# Patient Record
Sex: FEMALE
[De-identification: names, ages, dates, MRNs, and addresses within clinical notes are randomized per-mention and may not be internally consistent; named-entity substitution may affect disease eponyms.]

---

## 2020-08-18 ENCOUNTER — HOSPITAL ENCOUNTER (INPATIENT)
Dept: HOSPITAL 90 - EDH | Age: 85
LOS: 17 days | Discharge: NURSING, REHAB OR LONG-TERM CARE | DRG: 70 | End: 2020-09-04
Attending: INTERNAL MEDICINE | Admitting: INTERNAL MEDICINE
Payer: MEDICARE

## 2020-08-18 VITALS — WEIGHT: 152.5 LBS | HEIGHT: 60 IN | BODY MASS INDEX: 29.94 KG/M2

## 2020-08-18 DIAGNOSIS — E11.649: ICD-10-CM

## 2020-08-18 DIAGNOSIS — Z86.19: ICD-10-CM

## 2020-08-18 DIAGNOSIS — I10: ICD-10-CM

## 2020-08-18 DIAGNOSIS — E11.9: ICD-10-CM

## 2020-08-18 DIAGNOSIS — N17.9: ICD-10-CM

## 2020-08-18 DIAGNOSIS — G93.41: Primary | ICD-10-CM

## 2020-08-18 DIAGNOSIS — F03.90: ICD-10-CM

## 2020-08-18 DIAGNOSIS — U07.1: ICD-10-CM

## 2020-08-18 DIAGNOSIS — N39.0: ICD-10-CM

## 2020-08-18 DIAGNOSIS — R53.81: ICD-10-CM

## 2020-08-18 DIAGNOSIS — R62.7: ICD-10-CM

## 2020-08-18 DIAGNOSIS — Z74.01: ICD-10-CM

## 2020-08-18 DIAGNOSIS — J12.89: ICD-10-CM

## 2020-08-18 DIAGNOSIS — E86.0: ICD-10-CM

## 2020-08-18 DIAGNOSIS — Z87.440: ICD-10-CM

## 2020-08-18 DIAGNOSIS — D64.9: ICD-10-CM

## 2020-08-18 DIAGNOSIS — E83.52: ICD-10-CM

## 2020-08-18 DIAGNOSIS — E44.0: ICD-10-CM

## 2020-08-18 LAB
ALBUMIN SERPL-MCNC: 2.5 G/DL (ref 3.5–5)
ALT SERPL-CCNC: 22 U/L (ref 12–78)
AST SERPL-CCNC: 36 U/L (ref 10–37)
BASOPHILS NFR BLD AUTO: 0.6 % (ref 0–5)
BILIRUB SERPL-MCNC: 0.9 MG/DL (ref 0.2–1)
BUN SERPL-MCNC: 26 MG/DL (ref 7–18)
CHLORIDE SERPL-SCNC: 108 MMOL/L (ref 101–111)
CO2 SERPL-SCNC: 24 MMOL/L (ref 21–32)
CREAT SERPL-MCNC: 1.7 MG/DL (ref 0.5–1.5)
DEPRECATED SQUAMOUS URNS QL MICRO: (no result) /HPF (ref 0–2)
EOSINOPHIL NFR BLD AUTO: 0.5 % (ref 0–8)
ERYTHROCYTE [DISTWIDTH] IN BLOOD BY AUTOMATED COUNT: 19.7 % (ref 11–15.5)
GFR SERPL CREATININE-BSD FRML MDRD: 30 ML/MIN (ref 60–?)
GLUCOSE SERPL-MCNC: 129 MG/DL (ref 70–105)
HBA1C MFR BLD: 5.6 % (ref 4–6)
HCT VFR BLD AUTO: 37.9 % (ref 36–48)
HGB UR QL STRIP: (no result)
LYMPHOCYTES NFR SPEC AUTO: 25.3 % (ref 21–51)
MCH RBC QN AUTO: 29.2 PG (ref 27–33)
MCHC RBC AUTO-ENTMCNC: 31.9 G/DL (ref 32–36)
MCV RBC AUTO: 91.5 FL (ref 79–99)
MONOCYTES NFR BLD AUTO: 7.1 % (ref 3–13)
NEUTROPHILS NFR BLD AUTO: 65.9 % (ref 40–77)
NRBC BLD MANUAL-RTO: 0 % (ref 0–0.19)
PH UR STRIP: 5.5 [PH] (ref 5–8)
PLATELET # BLD AUTO: 272 K/UL (ref 130–400)
POTASSIUM SERPL-SCNC: 4.1 MMOL/L (ref 3.5–5.1)
PROT SERPL-MCNC: 7.1 G/DL (ref 6–8.3)
PROT UR QL STRIP: (no result) MG/DL
RBC # BLD AUTO: 4.14 MIL/UL (ref 4–5.5)
RBC #/AREA URNS HPF: (no result) /HPF (ref 0–1)
RBC MORPH BLD: (no result)
SODIUM SERPL-SCNC: 142 MMOL/L (ref 136–145)
SP GR UR STRIP: 1.01 (ref 1–1.03)
UROBILINOGEN UR STRIP-MCNC: 1 MG/DL (ref 0.2–1)
WBC # BLD AUTO: 15.8 K/UL (ref 4.8–10.8)
WBC #/AREA URNS HPF: (no result) /HPF (ref 0–1)

## 2020-08-18 PROCEDURE — 82330 ASSAY OF CALCIUM: CPT

## 2020-08-18 PROCEDURE — 82306 VITAMIN D 25 HYDROXY: CPT

## 2020-08-18 PROCEDURE — 81001 URINALYSIS AUTO W/SCOPE: CPT

## 2020-08-18 PROCEDURE — 83550 IRON BINDING TEST: CPT

## 2020-08-18 PROCEDURE — 82040 ASSAY OF SERUM ALBUMIN: CPT

## 2020-08-18 PROCEDURE — 80053 COMPREHEN METABOLIC PANEL: CPT

## 2020-08-18 PROCEDURE — 83540 ASSAY OF IRON: CPT

## 2020-08-18 PROCEDURE — 87040 BLOOD CULTURE FOR BACTERIA: CPT

## 2020-08-18 PROCEDURE — 97039 UNLISTED MODALITY: CPT

## 2020-08-18 PROCEDURE — 83605 ASSAY OF LACTIC ACID: CPT

## 2020-08-18 PROCEDURE — 92610 EVALUATE SWALLOWING FUNCTION: CPT

## 2020-08-18 PROCEDURE — 84443 ASSAY THYROID STIM HORMONE: CPT

## 2020-08-18 PROCEDURE — 87088 URINE BACTERIA CULTURE: CPT

## 2020-08-18 PROCEDURE — 71045 X-RAY EXAM CHEST 1 VIEW: CPT

## 2020-08-18 PROCEDURE — 84132 ASSAY OF SERUM POTASSIUM: CPT

## 2020-08-18 PROCEDURE — 85025 COMPLETE CBC W/AUTO DIFF WBC: CPT

## 2020-08-18 PROCEDURE — 83735 ASSAY OF MAGNESIUM: CPT

## 2020-08-18 PROCEDURE — 86592 SYPHILIS TEST NON-TREP QUAL: CPT

## 2020-08-18 PROCEDURE — 82140 ASSAY OF AMMONIA: CPT

## 2020-08-18 PROCEDURE — 94664 DEMO&/EVAL PT USE INHALER: CPT

## 2020-08-18 PROCEDURE — 82948 REAGENT STRIP/BLOOD GLUCOSE: CPT

## 2020-08-18 PROCEDURE — 82607 VITAMIN B-12: CPT

## 2020-08-18 PROCEDURE — 76705 ECHO EXAM OF ABDOMEN: CPT

## 2020-08-18 PROCEDURE — 80048 BASIC METABOLIC PNL TOTAL CA: CPT

## 2020-08-18 PROCEDURE — 84145 PROCALCITONIN (PCT): CPT

## 2020-08-18 PROCEDURE — 83036 HEMOGLOBIN GLYCOSYLATED A1C: CPT

## 2020-08-18 PROCEDURE — 82746 ASSAY OF FOLIC ACID SERUM: CPT

## 2020-08-18 PROCEDURE — 86334 IMMUNOFIX E-PHORESIS SERUM: CPT

## 2020-08-18 PROCEDURE — 82728 ASSAY OF FERRITIN: CPT

## 2020-08-18 PROCEDURE — 83970 ASSAY OF PARATHORMONE: CPT

## 2020-08-18 PROCEDURE — 70450 CT HEAD/BRAIN W/O DYE: CPT

## 2020-08-18 PROCEDURE — 36415 COLL VENOUS BLD VENIPUNCTURE: CPT

## 2020-08-18 PROCEDURE — 84100 ASSAY OF PHOSPHORUS: CPT

## 2020-08-18 PROCEDURE — 93005 ELECTROCARDIOGRAM TRACING: CPT

## 2020-08-18 RX ADMIN — FAMOTIDINE SCH MG: 20 TABLET, FILM COATED ORAL at 21:00

## 2020-08-18 RX ADMIN — PIPERACILLIN SODIUM AND TAZOBACTAM SODIUM SCH MLS/HR: .375; 3 INJECTION, POWDER, LYOPHILIZED, FOR SOLUTION INTRAVENOUS at 21:15

## 2020-08-18 RX ADMIN — PIPERACILLIN SODIUM AND TAZOBACTAM SODIUM SCH MLS/HR: .375; 3 INJECTION, POWDER, LYOPHILIZED, FOR SOLUTION INTRAVENOUS at 13:15

## 2020-08-18 RX ADMIN — SODIUM CHLORIDE SCH MLS/HR: 0.9 INJECTION, SOLUTION INTRAVENOUS at 13:15

## 2020-08-19 VITALS — DIASTOLIC BLOOD PRESSURE: 69 MMHG | SYSTOLIC BLOOD PRESSURE: 111 MMHG

## 2020-08-19 VITALS — SYSTOLIC BLOOD PRESSURE: 143 MMHG | DIASTOLIC BLOOD PRESSURE: 79 MMHG

## 2020-08-19 VITALS — SYSTOLIC BLOOD PRESSURE: 119 MMHG | DIASTOLIC BLOOD PRESSURE: 63 MMHG

## 2020-08-19 VITALS — DIASTOLIC BLOOD PRESSURE: 71 MMHG | SYSTOLIC BLOOD PRESSURE: 140 MMHG

## 2020-08-19 VITALS — DIASTOLIC BLOOD PRESSURE: 61 MMHG | SYSTOLIC BLOOD PRESSURE: 120 MMHG

## 2020-08-19 VITALS — DIASTOLIC BLOOD PRESSURE: 61 MMHG | SYSTOLIC BLOOD PRESSURE: 114 MMHG

## 2020-08-19 LAB
ALBUMIN SERPL-MCNC: 1.8 G/DL (ref 3.5–5)
ALT SERPL-CCNC: 14 U/L (ref 12–78)
AST SERPL-CCNC: 18 U/L (ref 10–37)
BASOPHILS NFR BLD AUTO: 0.7 % (ref 0–5)
BILIRUB SERPL-MCNC: 0.6 MG/DL (ref 0.2–1)
BUN SERPL-MCNC: 21 MG/DL (ref 7–18)
CHLORIDE SERPL-SCNC: 116 MMOL/L (ref 101–111)
CO2 SERPL-SCNC: 25 MMOL/L (ref 21–32)
CREAT SERPL-MCNC: 1.3 MG/DL (ref 0.5–1.5)
EOSINOPHIL NFR BLD AUTO: 1.3 % (ref 0–8)
ERYTHROCYTE [DISTWIDTH] IN BLOOD BY AUTOMATED COUNT: 19.7 % (ref 11–15.5)
GFR SERPL CREATININE-BSD FRML MDRD: 41 ML/MIN (ref 60–?)
GLUCOSE SERPL-MCNC: 84 MG/DL (ref 70–105)
HCT VFR BLD AUTO: 28.2 % (ref 36–48)
LYMPHOCYTES NFR SPEC AUTO: 27.2 % (ref 21–51)
MCH RBC QN AUTO: 29.8 PG (ref 27–33)
MCHC RBC AUTO-ENTMCNC: 32.3 G/DL (ref 32–36)
MCV RBC AUTO: 92.5 FL (ref 79–99)
MONOCYTES NFR BLD AUTO: 7.8 % (ref 3–13)
NEUTROPHILS NFR BLD AUTO: 61.9 % (ref 40–77)
NRBC BLD MANUAL-RTO: 0 % (ref 0–0.19)
PLATELET # BLD AUTO: 199 K/UL (ref 130–400)
POTASSIUM SERPL-SCNC: 3 MMOL/L (ref 3.5–5.1)
PROT SERPL-MCNC: 5.3 G/DL (ref 6–8.3)
RBC # BLD AUTO: 3.05 MIL/UL (ref 4–5.5)
SODIUM SERPL-SCNC: 147 MMOL/L (ref 136–145)
WBC # BLD AUTO: 10.7 K/UL (ref 4.8–10.8)

## 2020-08-19 RX ADMIN — PIPERACILLIN SODIUM AND TAZOBACTAM SODIUM SCH MLS/HR: .375; 3 INJECTION, POWDER, LYOPHILIZED, FOR SOLUTION INTRAVENOUS at 12:42

## 2020-08-19 RX ADMIN — FAMOTIDINE SCH MG: 20 TABLET, FILM COATED ORAL at 20:08

## 2020-08-19 RX ADMIN — PIPERACILLIN SODIUM AND TAZOBACTAM SODIUM SCH MLS/HR: .375; 3 INJECTION, POWDER, LYOPHILIZED, FOR SOLUTION INTRAVENOUS at 20:08

## 2020-08-19 RX ADMIN — POTASSIUM CHLORIDE PRN MEQ: 1.5 SOLUTION ORAL at 13:55

## 2020-08-19 RX ADMIN — PIPERACILLIN SODIUM AND TAZOBACTAM SODIUM SCH MLS/HR: .375; 3 INJECTION, POWDER, LYOPHILIZED, FOR SOLUTION INTRAVENOUS at 04:21

## 2020-08-19 RX ADMIN — POTASSIUM CHLORIDE PRN MEQ: 1.5 SOLUTION ORAL at 11:25

## 2020-08-19 RX ADMIN — FAMOTIDINE SCH MG: 20 TABLET, FILM COATED ORAL at 09:32

## 2020-08-19 RX ADMIN — POTASSIUM CHLORIDE PRN MEQ: 1.5 SOLUTION ORAL at 17:44

## 2020-08-19 RX ADMIN — SODIUM CHLORIDE SCH MLS/HR: 0.9 INJECTION, SOLUTION INTRAVENOUS at 09:15

## 2020-08-19 RX ADMIN — SODIUM CHLORIDE SCH MLS/HR: 0.9 INJECTION, SOLUTION INTRAVENOUS at 19:15

## 2020-08-19 RX ADMIN — SODIUM CHLORIDE SCH MLS/HR: 0.9 INJECTION, SOLUTION INTRAVENOUS at 04:21

## 2020-08-19 NOTE — NUR
CHART CHECK COMPLETED. 



Pt IS AN 85 Y.O. FEMALE ADMITTED SECONDARY TO AMS, DEHYDRATION, AND SEPSIS. Pt HAS A PAST 
MEDICAL HISTORY SIGNIFICANT FOR DM, HYPERTENSION, COVID JUNE 2020, CHOLECYSTECTOMY. Pt 
CURRENTLY ON REGULAR TEXTURE,THIN LIQUID DIET (HEART HEALTHY). PLEASE REQUEST FORMAL SKILLED 
SPEECH/SWALLOW EVALUATION IF Pt PRESENTS WITH +S/S OF ASPIRATION SUCH AS COUGH RESPONSE, 
THROAT CLEAR, OR WET VOCAL QUALITY DURING P.O. 

-------------------------------------------------------------------------------

Addendum: 08/19/20 at 1548 by BRITTANY DIALLO, [facility] ST

-------------------------------------------------------------------------------

Amended: Links added.

## 2020-08-19 NOTE — NUR
REPORTED TO ME LOW K OF 3.0, I INFORMED MAGEN WILSON FOR HOSPITALIST AND WILL COVER PT 
WITH POTASSIUM PROTOCOL.

## 2020-08-19 NOTE — NUR
REQUEST SENT TO [facility] FOR MEDICAL RECORDS AND GRAND DAUGHTER GAVE PERMISION TO 
REQUEST;  SHE ALSO STATED ONLY HOME MED PT TAKES IS LACTULOSE.

## 2020-08-20 VITALS — DIASTOLIC BLOOD PRESSURE: 66 MMHG | SYSTOLIC BLOOD PRESSURE: 127 MMHG

## 2020-08-20 VITALS — DIASTOLIC BLOOD PRESSURE: 80 MMHG | SYSTOLIC BLOOD PRESSURE: 142 MMHG

## 2020-08-20 VITALS — SYSTOLIC BLOOD PRESSURE: 129 MMHG | DIASTOLIC BLOOD PRESSURE: 74 MMHG

## 2020-08-20 VITALS — SYSTOLIC BLOOD PRESSURE: 118 MMHG | DIASTOLIC BLOOD PRESSURE: 66 MMHG

## 2020-08-20 VITALS — DIASTOLIC BLOOD PRESSURE: 69 MMHG | SYSTOLIC BLOOD PRESSURE: 133 MMHG

## 2020-08-20 LAB
ALBUMIN SERPL-MCNC: 2 G/DL (ref 3.5–5)
ALT SERPL-CCNC: 18 U/L (ref 12–78)
APPEARANCE UR: (no result)
AST SERPL-CCNC: 21 U/L (ref 10–37)
BASOPHILS NFR BLD AUTO: 0.8 % (ref 0–5)
BILIRUB SERPL-MCNC: 0.7 MG/DL (ref 0.2–1)
BILIRUB UR QL STRIP: NEGATIVE
BUN SERPL-MCNC: 15 MG/DL (ref 7–18)
CHLORIDE SERPL-SCNC: 116 MMOL/L (ref 101–111)
CO2 SERPL-SCNC: 21 MMOL/L (ref 21–32)
COLOR UR: YELLOW
CREAT SERPL-MCNC: 1.2 MG/DL (ref 0.5–1.5)
EOSINOPHIL NFR BLD AUTO: 2.7 % (ref 0–8)
ERYTHROCYTE [DISTWIDTH] IN BLOOD BY AUTOMATED COUNT: 19.9 % (ref 11–15.5)
GFR SERPL CREATININE-BSD FRML MDRD: 45 ML/MIN (ref 60–?)
GLUCOSE SERPL-MCNC: 80 MG/DL (ref 70–105)
GLUCOSE UR STRIP-MCNC: NEGATIVE MG/DL
HCT VFR BLD AUTO: 31.5 % (ref 36–48)
HGB UR QL STRIP: (no result)
KETONES UR STRIP-MCNC: NEGATIVE MG/DL
LEUKOCYTE ESTERASE UR QL STRIP: (no result)
LYMPHOCYTES NFR SPEC AUTO: 32.5 % (ref 21–51)
MCH RBC QN AUTO: 29.2 PG (ref 27–33)
MCHC RBC AUTO-ENTMCNC: 31.4 G/DL (ref 32–36)
MCV RBC AUTO: 92.9 FL (ref 79–99)
MONOCYTES NFR BLD AUTO: 7.4 % (ref 3–13)
NEUTROPHILS NFR BLD AUTO: 55.5 % (ref 40–77)
NITRITE UR QL STRIP: NEGATIVE
NRBC BLD MANUAL-RTO: 0 % (ref 0–0.19)
PH UR STRIP: 6.5 [PH] (ref 5–8)
PLATELET # BLD AUTO: 240 K/UL (ref 130–400)
POTASSIUM SERPL-SCNC: 3.9 MMOL/L (ref 3.5–5.1)
PROT SERPL-MCNC: 5.8 G/DL (ref 6–8.3)
PROT UR QL STRIP: 30 MG/DL
RBC # BLD AUTO: 3.39 MIL/UL (ref 4–5.5)
RBC #/AREA URNS HPF: (no result) /HPF (ref 0–1)
SODIUM SERPL-SCNC: 148 MMOL/L (ref 136–145)
SP GR UR STRIP: 1.01 (ref 1–1.03)
UROBILINOGEN UR STRIP-MCNC: 0.2 MG/DL (ref 0.2–1)
WBC # BLD AUTO: 12.7 K/UL (ref 4.8–10.8)
WBC #/AREA URNS HPF: (no result) /HPF (ref 0–1)

## 2020-08-20 RX ADMIN — PIPERACILLIN SODIUM AND TAZOBACTAM SODIUM SCH MLS/HR: .375; 3 INJECTION, POWDER, LYOPHILIZED, FOR SOLUTION INTRAVENOUS at 21:13

## 2020-08-20 RX ADMIN — SODIUM CHLORIDE SCH MLS/HR: 0.9 INJECTION, SOLUTION INTRAVENOUS at 04:00

## 2020-08-20 RX ADMIN — PIPERACILLIN SODIUM AND TAZOBACTAM SODIUM SCH MLS/HR: .375; 3 INJECTION, POWDER, LYOPHILIZED, FOR SOLUTION INTRAVENOUS at 12:44

## 2020-08-20 RX ADMIN — FAMOTIDINE SCH MG: 20 TABLET, FILM COATED ORAL at 08:03

## 2020-08-20 RX ADMIN — FAMOTIDINE SCH MG: 20 TABLET, FILM COATED ORAL at 21:13

## 2020-08-20 RX ADMIN — PIPERACILLIN SODIUM AND TAZOBACTAM SODIUM SCH MLS/HR: .375; 3 INJECTION, POWDER, LYOPHILIZED, FOR SOLUTION INTRAVENOUS at 04:16

## 2020-08-21 VITALS — SYSTOLIC BLOOD PRESSURE: 114 MMHG | DIASTOLIC BLOOD PRESSURE: 61 MMHG

## 2020-08-21 VITALS — SYSTOLIC BLOOD PRESSURE: 116 MMHG | DIASTOLIC BLOOD PRESSURE: 53 MMHG

## 2020-08-21 VITALS — DIASTOLIC BLOOD PRESSURE: 64 MMHG | SYSTOLIC BLOOD PRESSURE: 111 MMHG

## 2020-08-21 VITALS — DIASTOLIC BLOOD PRESSURE: 62 MMHG | SYSTOLIC BLOOD PRESSURE: 95 MMHG

## 2020-08-21 VITALS — DIASTOLIC BLOOD PRESSURE: 66 MMHG | SYSTOLIC BLOOD PRESSURE: 106 MMHG

## 2020-08-21 VITALS — SYSTOLIC BLOOD PRESSURE: 111 MMHG | DIASTOLIC BLOOD PRESSURE: 68 MMHG

## 2020-08-21 VITALS — DIASTOLIC BLOOD PRESSURE: 69 MMHG | SYSTOLIC BLOOD PRESSURE: 126 MMHG

## 2020-08-21 LAB
ALBUMIN SERPL-MCNC: 2.2 G/DL (ref 3.5–5)
ALT SERPL-CCNC: 18 U/L (ref 12–78)
AST SERPL-CCNC: 23 U/L (ref 10–37)
BASOPHILS NFR BLD AUTO: 0.7 % (ref 0–5)
BILIRUB SERPL-MCNC: 0.6 MG/DL (ref 0.2–1)
BUN SERPL-MCNC: 11 MG/DL (ref 7–18)
CHLORIDE SERPL-SCNC: 112 MMOL/L (ref 101–111)
CO2 SERPL-SCNC: 23 MMOL/L (ref 21–32)
CREAT SERPL-MCNC: 1 MG/DL (ref 0.5–1.5)
EOSINOPHIL NFR BLD AUTO: 3.7 % (ref 0–8)
ERYTHROCYTE [DISTWIDTH] IN BLOOD BY AUTOMATED COUNT: 19.9 % (ref 11–15.5)
GFR SERPL CREATININE-BSD FRML MDRD: 56 ML/MIN (ref 60–?)
GLUCOSE SERPL-MCNC: 78 MG/DL (ref 70–105)
HCT VFR BLD AUTO: 36.4 % (ref 36–48)
LYMPHOCYTES NFR SPEC AUTO: 33.5 % (ref 21–51)
MCH RBC QN AUTO: 29.4 PG (ref 27–33)
MCHC RBC AUTO-ENTMCNC: 31.9 G/DL (ref 32–36)
MCV RBC AUTO: 92.2 FL (ref 79–99)
MONOCYTES NFR BLD AUTO: 6.5 % (ref 3–13)
NEUTROPHILS NFR BLD AUTO: 54.7 % (ref 40–77)
NRBC BLD MANUAL-RTO: 0 % (ref 0–0.19)
PLATELET # BLD AUTO: 259 K/UL (ref 130–400)
POTASSIUM SERPL-SCNC: 4.2 MMOL/L (ref 3.5–5.1)
PROT SERPL-MCNC: 6.5 G/DL (ref 6–8.3)
RBC # BLD AUTO: 3.95 MIL/UL (ref 4–5.5)
SODIUM SERPL-SCNC: 145 MMOL/L (ref 136–145)
WBC # BLD AUTO: 13.7 K/UL (ref 4.8–10.8)

## 2020-08-21 RX ADMIN — PIPERACILLIN SODIUM AND TAZOBACTAM SODIUM SCH MLS/HR: .375; 3 INJECTION, POWDER, LYOPHILIZED, FOR SOLUTION INTRAVENOUS at 05:22

## 2020-08-21 RX ADMIN — FLUCONAZOLE SCH MG: 100 TABLET ORAL at 12:38

## 2020-08-21 RX ADMIN — WATER SCH GM: 1 INJECTION INTRAVENOUS at 20:31

## 2020-08-21 RX ADMIN — PIPERACILLIN SODIUM AND TAZOBACTAM SODIUM SCH MLS/HR: .375; 3 INJECTION, POWDER, LYOPHILIZED, FOR SOLUTION INTRAVENOUS at 20:30

## 2020-08-21 RX ADMIN — FAMOTIDINE SCH MG: 20 TABLET, FILM COATED ORAL at 08:35

## 2020-08-21 RX ADMIN — FAMOTIDINE SCH MG: 20 TABLET, FILM COATED ORAL at 20:31

## 2020-08-21 RX ADMIN — PIPERACILLIN SODIUM AND TAZOBACTAM SODIUM SCH MLS/HR: .375; 3 INJECTION, POWDER, LYOPHILIZED, FOR SOLUTION INTRAVENOUS at 16:44

## 2020-08-21 NOTE — NUR
IV restart to left forearm, 22 yumiko with good blood return. Dressing applied per protocol.

AM care provided.  No new variance this shift, pt remained A/O x 1. Blood sugar is 78 this

morning.  Apple juice given, pending breakfast.

## 2020-08-21 NOTE — NUR
UPDATED DAUGHTER ON DC PLANS.

 NO CONSENT ON ANY REFERRAL TO SNF YET. SUGGESTED BNR IN [place] BECAUSE IT IS IN 
NETWORK WIHT THE INSURANCE, BUT GRANDDAUGHTER MARIJA DID NOT WANT TO CONSDER YET. WANTS  TO 
INVESTIGATE ALL  FACILITIES THAT TAKE ALLWELL.



CALL TO [facility] - THEY DO

CALL TO [facility] - THEY  DO NOT

CALL TO [facility]-NO NOT  

[facility], NOT

 THIS INFO PASSED TO MARIJA VIA TEXT. AWAITING RESPONSE






-------------------------------------------------------------------------------

Addendum: 08/21/20 at 1153 by FLAVIA BRIAN RN CM

-------------------------------------------------------------------------------

Amended: Links added.

## 2020-08-21 NOTE — NUR
assessment

patient awake, alert, ox1, no sob, no c/o pain at this time, no sob at this time, turn 
patient q2 hours, f/c to gravity drainage with cloudy urine, keep siderails up x3 for safety 
precautions and bedalarm intact

## 2020-08-22 VITALS — SYSTOLIC BLOOD PRESSURE: 124 MMHG | DIASTOLIC BLOOD PRESSURE: 65 MMHG

## 2020-08-22 VITALS — DIASTOLIC BLOOD PRESSURE: 73 MMHG | SYSTOLIC BLOOD PRESSURE: 125 MMHG

## 2020-08-22 VITALS — SYSTOLIC BLOOD PRESSURE: 120 MMHG | DIASTOLIC BLOOD PRESSURE: 70 MMHG

## 2020-08-22 VITALS — DIASTOLIC BLOOD PRESSURE: 63 MMHG | SYSTOLIC BLOOD PRESSURE: 107 MMHG

## 2020-08-22 VITALS — DIASTOLIC BLOOD PRESSURE: 52 MMHG | SYSTOLIC BLOOD PRESSURE: 103 MMHG

## 2020-08-22 VITALS — SYSTOLIC BLOOD PRESSURE: 122 MMHG | DIASTOLIC BLOOD PRESSURE: 70 MMHG

## 2020-08-22 LAB
ALBUMIN SERPL-MCNC: 1.8 G/DL (ref 3.5–5)
ALT SERPL-CCNC: 16 U/L (ref 12–78)
AST SERPL-CCNC: 21 U/L (ref 10–37)
BASOPHILS NFR BLD AUTO: 0.8 % (ref 0–5)
BILIRUB SERPL-MCNC: 0.4 MG/DL (ref 0.2–1)
BUN SERPL-MCNC: 11 MG/DL (ref 7–18)
CHLORIDE SERPL-SCNC: 113 MMOL/L (ref 101–111)
CO2 SERPL-SCNC: 20 MMOL/L (ref 21–32)
CREAT SERPL-MCNC: 1 MG/DL (ref 0.5–1.5)
EOSINOPHIL NFR BLD AUTO: 6.4 % (ref 0–8)
ERYTHROCYTE [DISTWIDTH] IN BLOOD BY AUTOMATED COUNT: 19.9 % (ref 11–15.5)
GFR SERPL CREATININE-BSD FRML MDRD: 56 ML/MIN (ref 60–?)
GLUCOSE SERPL-MCNC: 97 MG/DL (ref 70–105)
HCT VFR BLD AUTO: 31 % (ref 36–48)
LYMPHOCYTES NFR SPEC AUTO: 37.2 % (ref 21–51)
MCH RBC QN AUTO: 29.2 PG (ref 27–33)
MCHC RBC AUTO-ENTMCNC: 32.3 G/DL (ref 32–36)
MCV RBC AUTO: 90.6 FL (ref 79–99)
MONOCYTES NFR BLD AUTO: 6.4 % (ref 3–13)
NEUTROPHILS NFR BLD AUTO: 48.1 % (ref 40–77)
NRBC BLD MANUAL-RTO: 0 % (ref 0–0.19)
PLATELET # BLD AUTO: 233 K/UL (ref 130–400)
POTASSIUM SERPL-SCNC: 3.3 MMOL/L (ref 3.5–5.1)
PROT SERPL-MCNC: 5.3 G/DL (ref 6–8.3)
RBC # BLD AUTO: 3.42 MIL/UL (ref 4–5.5)
SODIUM SERPL-SCNC: 145 MMOL/L (ref 136–145)
WBC # BLD AUTO: 10.6 K/UL (ref 4.8–10.8)

## 2020-08-22 RX ADMIN — WATER SCH GM: 1 INJECTION INTRAVENOUS at 19:25

## 2020-08-22 RX ADMIN — POTASSIUM CHLORIDE PRN MEQ: 1.5 SOLUTION ORAL at 18:24

## 2020-08-22 RX ADMIN — PIPERACILLIN SODIUM AND TAZOBACTAM SODIUM SCH MLS/HR: .375; 3 INJECTION, POWDER, LYOPHILIZED, FOR SOLUTION INTRAVENOUS at 15:21

## 2020-08-22 RX ADMIN — FLUCONAZOLE SCH MG: 100 TABLET ORAL at 07:57

## 2020-08-22 RX ADMIN — FAMOTIDINE SCH MG: 20 TABLET, FILM COATED ORAL at 07:57

## 2020-08-22 RX ADMIN — PIPERACILLIN SODIUM AND TAZOBACTAM SODIUM SCH MLS/HR: .375; 3 INJECTION, POWDER, LYOPHILIZED, FOR SOLUTION INTRAVENOUS at 05:20

## 2020-08-22 RX ADMIN — POTASSIUM CHLORIDE PRN MEQ: 1.5 SOLUTION ORAL at 07:57

## 2020-08-22 RX ADMIN — POTASSIUM CHLORIDE PRN MEQ: 1.5 SOLUTION ORAL at 06:52

## 2020-08-22 RX ADMIN — PIPERACILLIN SODIUM AND TAZOBACTAM SODIUM SCH MLS/HR: .375; 3 INJECTION, POWDER, LYOPHILIZED, FOR SOLUTION INTRAVENOUS at 21:45

## 2020-08-22 RX ADMIN — WATER SCH GM: 1 INJECTION INTRAVENOUS at 07:56

## 2020-08-22 RX ADMIN — FAMOTIDINE SCH MG: 20 TABLET, FILM COATED ORAL at 20:07

## 2020-08-22 NOTE — NUR
SNF/HNR:



Received call this am from ru Quarles. She mentions that family has decided to 
refer pt to [facility]. telephone consent obtained. 



Clinical/PASRR faxed to [facility]. Spoke sandeep Garcias-Liaison, she mentions 
will review information and submit to insurance for auth. 



CN updated. CM to continue to follow.

## 2020-08-22 NOTE — NUR
assessment

patient more awake and talkative, ox1, no sob, no c/o pain at this time, turn patient q2 
hours, no teaching performed , unable to comprehend teaching

## 2020-08-23 VITALS — SYSTOLIC BLOOD PRESSURE: 125 MMHG | DIASTOLIC BLOOD PRESSURE: 71 MMHG

## 2020-08-23 VITALS — DIASTOLIC BLOOD PRESSURE: 60 MMHG | SYSTOLIC BLOOD PRESSURE: 111 MMHG

## 2020-08-23 VITALS — SYSTOLIC BLOOD PRESSURE: 122 MMHG | DIASTOLIC BLOOD PRESSURE: 73 MMHG

## 2020-08-23 VITALS — DIASTOLIC BLOOD PRESSURE: 50 MMHG | SYSTOLIC BLOOD PRESSURE: 105 MMHG

## 2020-08-23 VITALS — DIASTOLIC BLOOD PRESSURE: 53 MMHG | SYSTOLIC BLOOD PRESSURE: 105 MMHG

## 2020-08-23 VITALS — DIASTOLIC BLOOD PRESSURE: 80 MMHG | SYSTOLIC BLOOD PRESSURE: 113 MMHG

## 2020-08-23 LAB
BASOPHILS NFR BLD AUTO: 0.9 % (ref 0–5)
BUN SERPL-MCNC: 10 MG/DL (ref 7–18)
CHLORIDE SERPL-SCNC: 114 MMOL/L (ref 101–111)
CO2 SERPL-SCNC: 23 MMOL/L (ref 21–32)
CREAT SERPL-MCNC: 1.1 MG/DL (ref 0.5–1.5)
EOSINOPHIL NFR BLD AUTO: 6.1 % (ref 0–8)
ERYTHROCYTE [DISTWIDTH] IN BLOOD BY AUTOMATED COUNT: 20.2 % (ref 11–15.5)
GFR SERPL CREATININE-BSD FRML MDRD: 50 ML/MIN (ref 60–?)
GLUCOSE SERPL-MCNC: 84 MG/DL (ref 70–105)
HCT VFR BLD AUTO: 33.3 % (ref 36–48)
LYMPHOCYTES NFR SPEC AUTO: 34 % (ref 21–51)
MCH RBC QN AUTO: 28.9 PG (ref 27–33)
MCHC RBC AUTO-ENTMCNC: 31.5 G/DL (ref 32–36)
MCV RBC AUTO: 91.7 FL (ref 79–99)
MONOCYTES NFR BLD AUTO: 8.2 % (ref 3–13)
NEUTROPHILS NFR BLD AUTO: 49.5 % (ref 40–77)
NRBC BLD MANUAL-RTO: 0 % (ref 0–0.19)
PLATELET # BLD AUTO: 222 K/UL (ref 130–400)
POTASSIUM SERPL-SCNC: 4.1 MMOL/L (ref 3.5–5.1)
RBC # BLD AUTO: 3.63 MIL/UL (ref 4–5.5)
SODIUM SERPL-SCNC: 145 MMOL/L (ref 136–145)
WBC # BLD AUTO: 13.6 K/UL (ref 4.8–10.8)

## 2020-08-23 RX ADMIN — PIPERACILLIN SODIUM AND TAZOBACTAM SODIUM SCH MLS/HR: .375; 3 INJECTION, POWDER, LYOPHILIZED, FOR SOLUTION INTRAVENOUS at 15:01

## 2020-08-23 RX ADMIN — WATER SCH GM: 1 INJECTION INTRAVENOUS at 19:53

## 2020-08-23 RX ADMIN — PIPERACILLIN SODIUM AND TAZOBACTAM SODIUM SCH MLS/HR: .375; 3 INJECTION, POWDER, LYOPHILIZED, FOR SOLUTION INTRAVENOUS at 21:49

## 2020-08-23 RX ADMIN — PIPERACILLIN SODIUM AND TAZOBACTAM SODIUM SCH MLS/HR: .375; 3 INJECTION, POWDER, LYOPHILIZED, FOR SOLUTION INTRAVENOUS at 05:02

## 2020-08-23 RX ADMIN — WATER SCH GM: 1 INJECTION INTRAVENOUS at 10:19

## 2020-08-23 RX ADMIN — FAMOTIDINE SCH MG: 20 TABLET, FILM COATED ORAL at 10:19

## 2020-08-23 RX ADMIN — FAMOTIDINE SCH MG: 20 TABLET, FILM COATED ORAL at 19:54

## 2020-08-23 RX ADMIN — FLUCONAZOLE SCH MG: 100 TABLET ORAL at 10:19
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
## 2020-08-24 VITALS — DIASTOLIC BLOOD PRESSURE: 66 MMHG | SYSTOLIC BLOOD PRESSURE: 123 MMHG

## 2020-08-24 VITALS — DIASTOLIC BLOOD PRESSURE: 50 MMHG | SYSTOLIC BLOOD PRESSURE: 108 MMHG

## 2020-08-24 VITALS — DIASTOLIC BLOOD PRESSURE: 62 MMHG | SYSTOLIC BLOOD PRESSURE: 101 MMHG

## 2020-08-24 VITALS — DIASTOLIC BLOOD PRESSURE: 72 MMHG | SYSTOLIC BLOOD PRESSURE: 106 MMHG

## 2020-08-24 VITALS — SYSTOLIC BLOOD PRESSURE: 115 MMHG | DIASTOLIC BLOOD PRESSURE: 66 MMHG

## 2020-08-24 VITALS — DIASTOLIC BLOOD PRESSURE: 63 MMHG | SYSTOLIC BLOOD PRESSURE: 121 MMHG

## 2020-08-24 LAB
BASOPHILS NFR BLD AUTO: 0.8 % (ref 0–5)
BUN SERPL-MCNC: 10 MG/DL (ref 7–18)
CHLORIDE SERPL-SCNC: 111 MMOL/L (ref 101–111)
CO2 SERPL-SCNC: 23 MMOL/L (ref 21–32)
CREAT SERPL-MCNC: 1.1 MG/DL (ref 0.5–1.5)
EOSINOPHIL NFR BLD AUTO: 6.9 % (ref 0–8)
ERYTHROCYTE [DISTWIDTH] IN BLOOD BY AUTOMATED COUNT: 19.9 % (ref 11–15.5)
GFR SERPL CREATININE-BSD FRML MDRD: 50 ML/MIN (ref 60–?)
GLUCOSE SERPL-MCNC: 78 MG/DL (ref 70–105)
HCT VFR BLD AUTO: 32.4 % (ref 36–48)
LYMPHOCYTES NFR SPEC AUTO: 25.8 % (ref 21–51)
MCH RBC QN AUTO: 29.1 PG (ref 27–33)
MCHC RBC AUTO-ENTMCNC: 32.1 G/DL (ref 32–36)
MCV RBC AUTO: 90.8 FL (ref 79–99)
MONOCYTES NFR BLD AUTO: 8.1 % (ref 3–13)
NEUTROPHILS NFR BLD AUTO: 57 % (ref 40–77)
NRBC BLD MANUAL-RTO: 0 % (ref 0–0.19)
PLATELET # BLD AUTO: 202 K/UL (ref 130–400)
POTASSIUM SERPL-SCNC: 4 MMOL/L (ref 3.5–5.1)
RBC # BLD AUTO: 3.57 MIL/UL (ref 4–5.5)
SODIUM SERPL-SCNC: 142 MMOL/L (ref 136–145)
WBC # BLD AUTO: 14.5 K/UL (ref 4.8–10.8)

## 2020-08-24 RX ADMIN — PIPERACILLIN SODIUM AND TAZOBACTAM SODIUM SCH MLS/HR: .375; 3 INJECTION, POWDER, LYOPHILIZED, FOR SOLUTION INTRAVENOUS at 22:54

## 2020-08-24 RX ADMIN — FAMOTIDINE SCH MG: 20 TABLET, FILM COATED ORAL at 19:31

## 2020-08-24 RX ADMIN — WATER SCH GM: 1 INJECTION INTRAVENOUS at 19:31

## 2020-08-24 RX ADMIN — WATER SCH GM: 1 INJECTION INTRAVENOUS at 09:14

## 2020-08-24 RX ADMIN — FAMOTIDINE SCH MG: 20 TABLET, FILM COATED ORAL at 09:14

## 2020-08-24 RX ADMIN — PIPERACILLIN SODIUM AND TAZOBACTAM SODIUM SCH MLS/HR: .375; 3 INJECTION, POWDER, LYOPHILIZED, FOR SOLUTION INTRAVENOUS at 16:01

## 2020-08-24 RX ADMIN — FLUCONAZOLE SCH MG: 100 TABLET ORAL at 09:14

## 2020-08-24 RX ADMIN — PIPERACILLIN SODIUM AND TAZOBACTAM SODIUM SCH MLS/HR: .375; 3 INJECTION, POWDER, LYOPHILIZED, FOR SOLUTION INTRAVENOUS at 04:42

## 2020-08-24 NOTE — NUR
CHART REVIEWED, ANTICIPATING DISCHARGE TO [facility] TODAY [facility], WILL SEND UPDATED AND MED 
REC WHEN AVAILABLE AND WILL ARRANGE EMS TRANSPORT. WILL FOLLOW WITH LESIA SHERMAN. MORENO, 

-------------------------------------------------------------------------------

Addendum: 08/24/20 at 0917 by FLAVIA BRIAN RN CM

-------------------------------------------------------------------------------

Amended: Links added.

## 2020-08-25 VITALS — DIASTOLIC BLOOD PRESSURE: 67 MMHG | SYSTOLIC BLOOD PRESSURE: 109 MMHG

## 2020-08-25 VITALS — SYSTOLIC BLOOD PRESSURE: 138 MMHG | DIASTOLIC BLOOD PRESSURE: 71 MMHG

## 2020-08-25 VITALS — SYSTOLIC BLOOD PRESSURE: 121 MMHG | DIASTOLIC BLOOD PRESSURE: 76 MMHG

## 2020-08-25 VITALS — SYSTOLIC BLOOD PRESSURE: 115 MMHG | DIASTOLIC BLOOD PRESSURE: 72 MMHG

## 2020-08-25 VITALS — DIASTOLIC BLOOD PRESSURE: 62 MMHG | SYSTOLIC BLOOD PRESSURE: 101 MMHG

## 2020-08-25 VITALS — SYSTOLIC BLOOD PRESSURE: 134 MMHG | DIASTOLIC BLOOD PRESSURE: 54 MMHG

## 2020-08-25 LAB
BASOPHILS NFR BLD AUTO: 0.8 % (ref 0–5)
BUN SERPL-MCNC: 10 MG/DL (ref 7–18)
CHLORIDE SERPL-SCNC: 110 MMOL/L (ref 101–111)
CO2 SERPL-SCNC: 26 MMOL/L (ref 21–32)
CREAT SERPL-MCNC: 1.1 MG/DL (ref 0.5–1.5)
EOSINOPHIL NFR BLD AUTO: 5.8 % (ref 0–8)
ERYTHROCYTE [DISTWIDTH] IN BLOOD BY AUTOMATED COUNT: 19.8 % (ref 11–15.5)
GFR SERPL CREATININE-BSD FRML MDRD: 50 ML/MIN (ref 60–?)
GLUCOSE SERPL-MCNC: 88 MG/DL (ref 70–105)
HCT VFR BLD AUTO: 33.6 % (ref 36–48)
LYMPHOCYTES NFR SPEC AUTO: 31.5 % (ref 21–51)
MCH RBC QN AUTO: 29.5 PG (ref 27–33)
MCHC RBC AUTO-ENTMCNC: 32.4 G/DL (ref 32–36)
MCV RBC AUTO: 90.8 FL (ref 79–99)
MONOCYTES NFR BLD AUTO: 7.1 % (ref 3–13)
NEUTROPHILS NFR BLD AUTO: 53.5 % (ref 40–77)
NRBC BLD MANUAL-RTO: 0 % (ref 0–0.19)
PLATELET # BLD AUTO: 186 K/UL (ref 130–400)
POTASSIUM SERPL-SCNC: 3.8 MMOL/L (ref 3.5–5.1)
RBC # BLD AUTO: 3.7 MIL/UL (ref 4–5.5)
SODIUM SERPL-SCNC: 143 MMOL/L (ref 136–145)
WBC # BLD AUTO: 12.8 K/UL (ref 4.8–10.8)

## 2020-08-25 RX ADMIN — PIPERACILLIN SODIUM AND TAZOBACTAM SODIUM SCH MLS/HR: .375; 3 INJECTION, POWDER, LYOPHILIZED, FOR SOLUTION INTRAVENOUS at 20:04

## 2020-08-25 RX ADMIN — FAMOTIDINE SCH MG: 20 TABLET, FILM COATED ORAL at 09:26

## 2020-08-25 RX ADMIN — PIPERACILLIN SODIUM AND TAZOBACTAM SODIUM SCH MLS/HR: .375; 3 INJECTION, POWDER, LYOPHILIZED, FOR SOLUTION INTRAVENOUS at 13:17

## 2020-08-25 RX ADMIN — FLUCONAZOLE SCH MG: 100 TABLET ORAL at 09:26

## 2020-08-25 RX ADMIN — WATER SCH GM: 1 INJECTION INTRAVENOUS at 09:26

## 2020-08-25 RX ADMIN — PIPERACILLIN SODIUM AND TAZOBACTAM SODIUM SCH MLS/HR: .375; 3 INJECTION, POWDER, LYOPHILIZED, FOR SOLUTION INTRAVENOUS at 05:25

## 2020-08-25 RX ADMIN — FAMOTIDINE SCH MG: 20 TABLET, FILM COATED ORAL at 20:04

## 2020-08-25 RX ADMIN — WATER SCH GM: 1 INJECTION INTRAVENOUS at 20:04

## 2020-08-25 NOTE — NUR
COVID POSITIVE

PATIENT'S COVID-19 PCR RESULTS CAME BACK POSITIVE.  KENTON MAYER NOTIFIED OF RESULTS AND 
GAVE ORDERS TO TRANSFER TO COVID UNIT.  REPORT GIVEN TO LIDA SHERMAN AND PATIENT WITH ALL 
BELONGINGS TRANSPORTED TO ROOM 432.  STAFF WILL INFORM FAMILY OF TRANSFER IN AM.

## 2020-08-25 NOTE — NUR
RDSCREEN - LOS X 7



Pt admitted with AMS, Dehydration, Sepsis. PMH of DM, HTN, UTI, Dementia, COVID-19.

Pt tolerating Heart Healthy diet order with no report of GI distress, Fair PO intake at 
50-75%. 



Recommend Ensure QD

Recommend continue Heart Healthy diet order



RD to continue to monitor. Please notify as additional nutrition concerns arise. Thank you.

## 2020-08-25 NOTE — NUR
CM Note: HNR pending approval

CM spoke to Katerine Michele, received covid result. Pt currently pending approval at this time. 
Send request to [facility] for EMS transport today, confirmation received. [facility] arranged and 
faxed for today, primary nurse to call [facility] once pt ready to DC. Primary nurse aware. CM to 
cont to follow up.

## 2020-08-26 VITALS — DIASTOLIC BLOOD PRESSURE: 78 MMHG | SYSTOLIC BLOOD PRESSURE: 128 MMHG

## 2020-08-26 VITALS — SYSTOLIC BLOOD PRESSURE: 127 MMHG | DIASTOLIC BLOOD PRESSURE: 76 MMHG

## 2020-08-26 VITALS — SYSTOLIC BLOOD PRESSURE: 116 MMHG | DIASTOLIC BLOOD PRESSURE: 87 MMHG

## 2020-08-26 VITALS — SYSTOLIC BLOOD PRESSURE: 116 MMHG | DIASTOLIC BLOOD PRESSURE: 71 MMHG

## 2020-08-26 VITALS — DIASTOLIC BLOOD PRESSURE: 65 MMHG | SYSTOLIC BLOOD PRESSURE: 119 MMHG

## 2020-08-26 VITALS — SYSTOLIC BLOOD PRESSURE: 107 MMHG | DIASTOLIC BLOOD PRESSURE: 63 MMHG

## 2020-08-26 LAB
ALBUMIN SERPL-MCNC: 2.1 G/DL (ref 3.5–5)
BASOPHILS NFR BLD AUTO: 0.8 % (ref 0–5)
BUN SERPL-MCNC: 12 MG/DL (ref 7–18)
CHLORIDE SERPL-SCNC: 112 MMOL/L (ref 101–111)
CO2 SERPL-SCNC: 22 MMOL/L (ref 21–32)
CREAT SERPL-MCNC: 1.2 MG/DL (ref 0.5–1.5)
EOSINOPHIL NFR BLD AUTO: 5.8 % (ref 0–8)
ERYTHROCYTE [DISTWIDTH] IN BLOOD BY AUTOMATED COUNT: 19.9 % (ref 11–15.5)
GFR SERPL CREATININE-BSD FRML MDRD: 45 ML/MIN (ref 60–?)
GLUCOSE SERPL-MCNC: 91 MG/DL (ref 70–105)
HCT VFR BLD AUTO: 33.4 % (ref 36–48)
LYMPHOCYTES NFR SPEC AUTO: 35.7 % (ref 21–51)
MAGNESIUM SERPL-MCNC: 2.3 MG/DL (ref 1.8–2.4)
MCH RBC QN AUTO: 29.2 PG (ref 27–33)
MCHC RBC AUTO-ENTMCNC: 32.3 G/DL (ref 32–36)
MCV RBC AUTO: 90.3 FL (ref 79–99)
MONOCYTES NFR BLD AUTO: 8.8 % (ref 3–13)
NEUTROPHILS NFR BLD AUTO: 47.1 % (ref 40–77)
NRBC BLD MANUAL-RTO: 0 % (ref 0–0.19)
PLATELET # BLD AUTO: 226 K/UL (ref 130–400)
POTASSIUM SERPL-SCNC: 3.4 MMOL/L (ref 3.5–5.1)
RBC # BLD AUTO: 3.7 MIL/UL (ref 4–5.5)
RBC MORPH BLD: (no result)
SODIUM SERPL-SCNC: 143 MMOL/L (ref 136–145)
TSH SERPL DL<=0.05 MIU/L-ACNC: 2.96 UIU/ML (ref 0.36–3.74)
WBC # BLD AUTO: 15.1 K/UL (ref 4.8–10.8)

## 2020-08-26 RX ADMIN — FLUCONAZOLE SCH MG: 100 TABLET ORAL at 09:07

## 2020-08-26 RX ADMIN — PIPERACILLIN SODIUM AND TAZOBACTAM SODIUM SCH MLS/HR: .375; 3 INJECTION, POWDER, LYOPHILIZED, FOR SOLUTION INTRAVENOUS at 19:59

## 2020-08-26 RX ADMIN — FAMOTIDINE SCH MG: 20 TABLET, FILM COATED ORAL at 09:08

## 2020-08-26 RX ADMIN — FAMOTIDINE SCH MG: 20 TABLET, FILM COATED ORAL at 19:58

## 2020-08-26 RX ADMIN — PIPERACILLIN SODIUM AND TAZOBACTAM SODIUM SCH MLS/HR: .375; 3 INJECTION, POWDER, LYOPHILIZED, FOR SOLUTION INTRAVENOUS at 06:08

## 2020-08-26 RX ADMIN — WATER SCH GM: 1 INJECTION INTRAVENOUS at 09:07

## 2020-08-26 RX ADMIN — PIPERACILLIN SODIUM AND TAZOBACTAM SODIUM SCH MLS/HR: .375; 3 INJECTION, POWDER, LYOPHILIZED, FOR SOLUTION INTRAVENOUS at 14:23

## 2020-08-26 RX ADMIN — WATER SCH GM: 1 INJECTION INTRAVENOUS at 19:58

## 2020-08-26 RX ADMIN — ENOXAPARIN SODIUM SCH MG: 30 INJECTION SUBCUTANEOUS at 19:58

## 2020-08-26 NOTE — NUR
CM Note: HNR approval

CM spoke to Katerine Michele, pt has approval. EMS arranged and faxed for today, primary nurse 
to call [facility] once pt ready to DC. Primary nurse aware. Pt pending Dr Darnell consult today re: 
AMS, confusion. CM to cont to follow up.

## 2020-08-27 VITALS — DIASTOLIC BLOOD PRESSURE: 76 MMHG | SYSTOLIC BLOOD PRESSURE: 113 MMHG

## 2020-08-27 VITALS — DIASTOLIC BLOOD PRESSURE: 53 MMHG | SYSTOLIC BLOOD PRESSURE: 116 MMHG

## 2020-08-27 VITALS — SYSTOLIC BLOOD PRESSURE: 124 MMHG | DIASTOLIC BLOOD PRESSURE: 78 MMHG

## 2020-08-27 VITALS — SYSTOLIC BLOOD PRESSURE: 106 MMHG | DIASTOLIC BLOOD PRESSURE: 76 MMHG

## 2020-08-27 VITALS — DIASTOLIC BLOOD PRESSURE: 68 MMHG | SYSTOLIC BLOOD PRESSURE: 124 MMHG

## 2020-08-27 VITALS — DIASTOLIC BLOOD PRESSURE: 72 MMHG | SYSTOLIC BLOOD PRESSURE: 109 MMHG

## 2020-08-27 LAB
BUN SERPL-MCNC: 13 MG/DL (ref 7–18)
CHLORIDE SERPL-SCNC: 110 MMOL/L (ref 101–111)
CO2 SERPL-SCNC: 22 MMOL/L (ref 21–32)
CREAT SERPL-MCNC: 1.3 MG/DL (ref 0.5–1.5)
GFR SERPL CREATININE-BSD FRML MDRD: 41 ML/MIN (ref 60–?)
GLUCOSE SERPL-MCNC: 106 MG/DL (ref 70–105)
MAGNESIUM SERPL-MCNC: 1.8 MG/DL (ref 1.8–2.4)
POTASSIUM SERPL-SCNC: 3.5 MMOL/L (ref 3.5–5.1)
SODIUM SERPL-SCNC: 144 MMOL/L (ref 136–145)

## 2020-08-27 RX ADMIN — FLUCONAZOLE SCH MG: 100 TABLET ORAL at 08:34

## 2020-08-27 RX ADMIN — PIPERACILLIN SODIUM AND TAZOBACTAM SODIUM SCH MLS/HR: .375; 3 INJECTION, POWDER, LYOPHILIZED, FOR SOLUTION INTRAVENOUS at 21:19

## 2020-08-27 RX ADMIN — ENOXAPARIN SODIUM SCH MG: 30 INJECTION SUBCUTANEOUS at 08:34

## 2020-08-27 RX ADMIN — PIPERACILLIN SODIUM AND TAZOBACTAM SODIUM SCH MLS/HR: .375; 3 INJECTION, POWDER, LYOPHILIZED, FOR SOLUTION INTRAVENOUS at 05:28

## 2020-08-27 RX ADMIN — SODIUM CHLORIDE SCH MLS/HR: 0.9 INJECTION, SOLUTION INTRAVENOUS at 21:19

## 2020-08-27 RX ADMIN — WATER SCH GM: 1 INJECTION INTRAVENOUS at 08:34

## 2020-08-27 RX ADMIN — FAMOTIDINE SCH MG: 20 TABLET, FILM COATED ORAL at 21:19

## 2020-08-27 RX ADMIN — PIPERACILLIN SODIUM AND TAZOBACTAM SODIUM SCH MLS/HR: .375; 3 INJECTION, POWDER, LYOPHILIZED, FOR SOLUTION INTRAVENOUS at 13:15

## 2020-08-27 RX ADMIN — WATER SCH GM: 1 INJECTION INTRAVENOUS at 21:19

## 2020-08-27 RX ADMIN — FAMOTIDINE SCH MG: 20 TABLET, FILM COATED ORAL at 08:34

## 2020-08-27 NOTE — NUR
RD UPDATE



Pt with Poor PO intake. No report of GI distress. Pt with AMS.

WBC 15.1, GFR 41, , Ca 10.8, Alb 2.1.



Recommend trial of Ensure TID with meals. 

RD to continue to monitor.

## 2020-08-28 VITALS — DIASTOLIC BLOOD PRESSURE: 68 MMHG | SYSTOLIC BLOOD PRESSURE: 103 MMHG

## 2020-08-28 VITALS — DIASTOLIC BLOOD PRESSURE: 70 MMHG | SYSTOLIC BLOOD PRESSURE: 129 MMHG

## 2020-08-28 VITALS — SYSTOLIC BLOOD PRESSURE: 129 MMHG | DIASTOLIC BLOOD PRESSURE: 63 MMHG

## 2020-08-28 VITALS — DIASTOLIC BLOOD PRESSURE: 72 MMHG | SYSTOLIC BLOOD PRESSURE: 110 MMHG

## 2020-08-28 VITALS — SYSTOLIC BLOOD PRESSURE: 120 MMHG | DIASTOLIC BLOOD PRESSURE: 64 MMHG

## 2020-08-28 VITALS — DIASTOLIC BLOOD PRESSURE: 54 MMHG | SYSTOLIC BLOOD PRESSURE: 109 MMHG

## 2020-08-28 LAB
ALBUMIN SERPL-MCNC: 1.7 G/DL (ref 3.5–5)
ALT SERPL-CCNC: 13 U/L (ref 12–78)
AST SERPL-CCNC: 21 U/L (ref 10–37)
BILIRUB SERPL-MCNC: 0.4 MG/DL (ref 0.2–1)
BUN SERPL-MCNC: 14 MG/DL (ref 7–18)
CHLORIDE SERPL-SCNC: 111 MMOL/L (ref 101–111)
CO2 SERPL-SCNC: 19 MMOL/L (ref 21–32)
CREAT SERPL-MCNC: 1.3 MG/DL (ref 0.5–1.5)
GFR SERPL CREATININE-BSD FRML MDRD: 41 ML/MIN (ref 60–?)
GLUCOSE SERPL-MCNC: 90 MG/DL (ref 70–105)
PHOSPHATE SERPL-MCNC: 2.8 MG/DL (ref 2.5–4.9)
POTASSIUM SERPL-SCNC: 3.6 MMOL/L (ref 3.5–5.1)
PROT SERPL-MCNC: 5.4 G/DL (ref 6–8.3)
SODIUM SERPL-SCNC: 143 MMOL/L (ref 136–145)

## 2020-08-28 RX ADMIN — FLUCONAZOLE SCH MG: 100 TABLET ORAL at 09:00

## 2020-08-28 RX ADMIN — PIPERACILLIN SODIUM AND TAZOBACTAM SODIUM SCH MLS/HR: .375; 3 INJECTION, POWDER, LYOPHILIZED, FOR SOLUTION INTRAVENOUS at 05:20

## 2020-08-28 RX ADMIN — FAMOTIDINE SCH MG: 20 TABLET, FILM COATED ORAL at 09:00

## 2020-08-28 RX ADMIN — SODIUM CHLORIDE SCH MLS/HR: 0.9 INJECTION, SOLUTION INTRAVENOUS at 22:15

## 2020-08-28 RX ADMIN — SODIUM CHLORIDE SCH MLS/HR: 0.9 INJECTION, SOLUTION INTRAVENOUS at 09:20

## 2020-08-28 RX ADMIN — WATER SCH GM: 1 INJECTION INTRAVENOUS at 09:00

## 2020-08-28 RX ADMIN — FAMOTIDINE SCH MG: 20 TABLET, FILM COATED ORAL at 22:15

## 2020-08-28 RX ADMIN — WATER SCH GM: 1 INJECTION INTRAVENOUS at 22:15

## 2020-08-28 RX ADMIN — ENOXAPARIN SODIUM SCH MG: 30 INJECTION SUBCUTANEOUS at 09:00

## 2020-08-29 VITALS — SYSTOLIC BLOOD PRESSURE: 110 MMHG | DIASTOLIC BLOOD PRESSURE: 35 MMHG

## 2020-08-29 VITALS — DIASTOLIC BLOOD PRESSURE: 74 MMHG | SYSTOLIC BLOOD PRESSURE: 130 MMHG

## 2020-08-29 VITALS — DIASTOLIC BLOOD PRESSURE: 87 MMHG | SYSTOLIC BLOOD PRESSURE: 143 MMHG

## 2020-08-29 VITALS — SYSTOLIC BLOOD PRESSURE: 125 MMHG | DIASTOLIC BLOOD PRESSURE: 65 MMHG

## 2020-08-29 VITALS — DIASTOLIC BLOOD PRESSURE: 75 MMHG | SYSTOLIC BLOOD PRESSURE: 136 MMHG

## 2020-08-29 VITALS — DIASTOLIC BLOOD PRESSURE: 66 MMHG | SYSTOLIC BLOOD PRESSURE: 117 MMHG

## 2020-08-29 LAB
ALBUMIN SERPL-MCNC: 1.7 G/DL (ref 3.5–5)
ALT SERPL-CCNC: 11 U/L (ref 12–78)
AST SERPL-CCNC: 17 U/L (ref 10–37)
BILIRUB SERPL-MCNC: 0.3 MG/DL (ref 0.2–1)
BUN SERPL-MCNC: 14 MG/DL (ref 7–18)
CHLORIDE SERPL-SCNC: 117 MMOL/L (ref 101–111)
CO2 SERPL-SCNC: 20 MMOL/L (ref 21–32)
CREAT SERPL-MCNC: 1.3 MG/DL (ref 0.5–1.5)
GFR SERPL CREATININE-BSD FRML MDRD: 41 ML/MIN (ref 60–?)
GLUCOSE SERPL-MCNC: 81 MG/DL (ref 70–105)
MAGNESIUM SERPL-MCNC: 1.5 MG/DL (ref 1.8–2.4)
MAGNESIUM SERPL-MCNC: 2.4 MG/DL (ref 1.8–2.4)
POTASSIUM SERPL-SCNC: 3.1 MMOL/L (ref 3.5–5.1)
POTASSIUM SERPL-SCNC: 4.6 MMOL/L (ref 3.5–5.1)
PROT SERPL-MCNC: 5 G/DL (ref 6–8.3)
SODIUM SERPL-SCNC: 148 MMOL/L (ref 136–145)

## 2020-08-29 RX ADMIN — POTASSIUM CHLORIDE PRN MEQ: 1.5 SOLUTION ORAL at 15:03

## 2020-08-29 RX ADMIN — FAMOTIDINE SCH MG: 20 TABLET, FILM COATED ORAL at 09:12

## 2020-08-29 RX ADMIN — POTASSIUM CHLORIDE, DEXTROSE MONOHYDRATE AND SODIUM CHLORIDE SCH MLS/HR: 150; 5; 900 INJECTION, SOLUTION INTRAVENOUS at 21:45

## 2020-08-29 RX ADMIN — WATER SCH GM: 1 INJECTION INTRAVENOUS at 09:00

## 2020-08-29 RX ADMIN — ENOXAPARIN SODIUM SCH MG: 30 INJECTION SUBCUTANEOUS at 09:12

## 2020-08-29 RX ADMIN — POTASSIUM CHLORIDE PRN MEQ: 1.5 SOLUTION ORAL at 13:59

## 2020-08-29 RX ADMIN — FAMOTIDINE SCH MG: 20 TABLET, FILM COATED ORAL at 23:20

## 2020-08-29 RX ADMIN — WATER SCH GM: 1 INJECTION INTRAVENOUS at 23:19

## 2020-08-29 RX ADMIN — POTASSIUM CHLORIDE, DEXTROSE MONOHYDRATE AND SODIUM CHLORIDE SCH MLS/HR: 150; 5; 900 INJECTION, SOLUTION INTRAVENOUS at 11:45

## 2020-08-29 RX ADMIN — MAGNESIUM SULFATE IN WATER SCH MLS/HR: 40 INJECTION, SOLUTION INTRAVENOUS at 14:02

## 2020-08-29 RX ADMIN — POTASSIUM CHLORIDE PRN MEQ: 1.5 SOLUTION ORAL at 17:16

## 2020-08-29 RX ADMIN — FLUCONAZOLE SCH MG: 100 TABLET ORAL at 09:12

## 2020-08-30 VITALS — DIASTOLIC BLOOD PRESSURE: 89 MMHG | SYSTOLIC BLOOD PRESSURE: 138 MMHG

## 2020-08-30 VITALS — DIASTOLIC BLOOD PRESSURE: 94 MMHG | SYSTOLIC BLOOD PRESSURE: 127 MMHG

## 2020-08-30 VITALS — SYSTOLIC BLOOD PRESSURE: 132 MMHG | DIASTOLIC BLOOD PRESSURE: 62 MMHG

## 2020-08-30 VITALS — SYSTOLIC BLOOD PRESSURE: 110 MMHG | DIASTOLIC BLOOD PRESSURE: 66 MMHG

## 2020-08-30 VITALS — DIASTOLIC BLOOD PRESSURE: 69 MMHG | SYSTOLIC BLOOD PRESSURE: 147 MMHG

## 2020-08-30 VITALS — SYSTOLIC BLOOD PRESSURE: 141 MMHG | DIASTOLIC BLOOD PRESSURE: 67 MMHG

## 2020-08-30 LAB
ALBUMIN SERPL-MCNC: 1.7 G/DL (ref 3.5–5)
ALT SERPL-CCNC: 12 U/L (ref 12–78)
AST SERPL-CCNC: 24 U/L (ref 10–37)
BASOPHILS NFR BLD AUTO: 1 % (ref 0–5)
BILIRUB SERPL-MCNC: 0.2 MG/DL (ref 0.2–1)
BUN SERPL-MCNC: 13 MG/DL (ref 7–18)
CHLORIDE SERPL-SCNC: 116 MMOL/L (ref 101–111)
CO2 SERPL-SCNC: 17 MMOL/L (ref 21–32)
CREAT SERPL-MCNC: 1.2 MG/DL (ref 0.5–1.5)
EOSINOPHIL NFR BLD AUTO: 6.6 % (ref 0–8)
ERYTHROCYTE [DISTWIDTH] IN BLOOD BY AUTOMATED COUNT: 19.8 % (ref 11–15.5)
GFR SERPL CREATININE-BSD FRML MDRD: 45 ML/MIN (ref 60–?)
GLUCOSE SERPL-MCNC: 125 MG/DL (ref 70–105)
HCT VFR BLD AUTO: 29.1 % (ref 36–48)
LYMPHOCYTES NFR SPEC AUTO: 36.5 % (ref 21–51)
MAGNESIUM SERPL-MCNC: 2.3 MG/DL (ref 1.8–2.4)
MCH RBC QN AUTO: 29.2 PG (ref 27–33)
MCHC RBC AUTO-ENTMCNC: 31.3 G/DL (ref 32–36)
MCV RBC AUTO: 93.3 FL (ref 79–99)
MONOCYTES NFR BLD AUTO: 6.9 % (ref 3–13)
NEUTROPHILS NFR BLD AUTO: 48.1 % (ref 40–77)
NRBC BLD MANUAL-RTO: 0 % (ref 0–0.19)
PLATELET # BLD AUTO: 210 K/UL (ref 130–400)
POTASSIUM SERPL-SCNC: 5 MMOL/L (ref 3.5–5.1)
PROT SERPL-MCNC: 5.1 G/DL (ref 6–8.3)
RBC # BLD AUTO: 3.12 MIL/UL (ref 4–5.5)
SODIUM SERPL-SCNC: 143 MMOL/L (ref 136–145)
WBC # BLD AUTO: 12.4 K/UL (ref 4.8–10.8)

## 2020-08-30 RX ADMIN — FAMOTIDINE SCH MG: 20 TABLET, FILM COATED ORAL at 10:00

## 2020-08-30 RX ADMIN — Medication SCH MLS/HR: at 12:30

## 2020-08-30 RX ADMIN — WATER SCH GM: 1 INJECTION INTRAVENOUS at 09:00

## 2020-08-30 RX ADMIN — ENOXAPARIN SODIUM SCH MG: 30 INJECTION SUBCUTANEOUS at 10:00

## 2020-08-30 RX ADMIN — FLUCONAZOLE SCH MG: 100 TABLET ORAL at 10:00

## 2020-08-30 RX ADMIN — FAMOTIDINE SCH MG: 20 TABLET, FILM COATED ORAL at 22:29

## 2020-08-30 RX ADMIN — WATER SCH GM: 1 INJECTION INTRAVENOUS at 22:29

## 2020-08-31 VITALS — SYSTOLIC BLOOD PRESSURE: 180 MMHG | DIASTOLIC BLOOD PRESSURE: 96 MMHG

## 2020-08-31 VITALS — SYSTOLIC BLOOD PRESSURE: 97 MMHG | DIASTOLIC BLOOD PRESSURE: 49 MMHG

## 2020-08-31 VITALS — DIASTOLIC BLOOD PRESSURE: 67 MMHG | SYSTOLIC BLOOD PRESSURE: 98 MMHG

## 2020-08-31 VITALS — SYSTOLIC BLOOD PRESSURE: 138 MMHG | DIASTOLIC BLOOD PRESSURE: 73 MMHG

## 2020-08-31 VITALS — DIASTOLIC BLOOD PRESSURE: 61 MMHG | SYSTOLIC BLOOD PRESSURE: 141 MMHG

## 2020-08-31 LAB
BASOPHILS NFR BLD MANUAL: 2 % (ref 0–2)
BUN SERPL-MCNC: 12 MG/DL (ref 7–18)
CHLORIDE SERPL-SCNC: 109 MMOL/L (ref 101–111)
CO2 SERPL-SCNC: 18 MMOL/L (ref 21–32)
CREAT SERPL-MCNC: 1 MG/DL (ref 0.5–1.5)
EOSINOPHIL NFR BLD MANUAL: 5 % (ref 1–6)
ERYTHROCYTE [DISTWIDTH] IN BLOOD BY AUTOMATED COUNT: 19.7 % (ref 11–15.5)
FERRITIN SERPL-MCNC: 570 NG/ML (ref 15–150)
GFR SERPL CREATININE-BSD FRML MDRD: 56 ML/MIN (ref 60–?)
GLUCOSE SERPL-MCNC: 95 MG/DL (ref 70–105)
HCT VFR BLD AUTO: 28.5 % (ref 36–48)
IRON SATN MFR SERPL: 46.6 % (ref 22–44)
IRON SERPL-MCNC: 63 MCG/DL (ref 50–170)
LYMPHOCYTES NFR BLD MANUAL: 38 % (ref 22–44)
MANUAL DIF COMMENT BLD-IMP: (no result)
MCH RBC QN AUTO: 29.4 PG (ref 27–33)
MCHC RBC AUTO-ENTMCNC: 31.9 G/DL (ref 32–36)
MCV RBC AUTO: 91.9 FL (ref 79–99)
MONOCYTES NFR BLD MANUAL: 6 % (ref 2–9)
NEUTS BAND NFR BLD MANUAL: 2 % (ref 0–2)
NEUTS SEG NFR BLD MANUAL: 47 % (ref 40–70)
NRBC BLD MANUAL-RTO: 0 % (ref 0–0.19)
PHOSPHATE SERPL-MCNC: 2.3 MG/DL (ref 2.5–4.9)
PLAT MORPH BLD: ADEQUATE
PLATELET # BLD AUTO: 241 K/UL (ref 130–400)
POTASSIUM SERPL-SCNC: 4.2 MMOL/L (ref 3.5–5.1)
RBC # BLD AUTO: 3.1 MIL/UL (ref 4–5.5)
RBC MORPH BLD: (no result)
SODIUM SERPL-SCNC: 137 MMOL/L (ref 136–145)
TIBC SERPL-MCNC: 135 MCG/DL (ref 250–450)
WBC # BLD AUTO: 12.6 K/UL (ref 4.8–10.8)

## 2020-08-31 RX ADMIN — FAMOTIDINE SCH MG: 20 TABLET, FILM COATED ORAL at 10:05

## 2020-08-31 RX ADMIN — Medication SCH MLS/HR: at 11:37

## 2020-08-31 RX ADMIN — ENOXAPARIN SODIUM SCH MG: 30 INJECTION SUBCUTANEOUS at 10:05

## 2020-08-31 RX ADMIN — WATER SCH GM: 1 INJECTION INTRAVENOUS at 20:48

## 2020-08-31 RX ADMIN — Medication SCH MLS/HR: at 02:21

## 2020-08-31 RX ADMIN — WATER SCH GM: 1 INJECTION INTRAVENOUS at 09:00

## 2020-08-31 RX ADMIN — Medication SCH MLS/HR: at 22:29

## 2020-08-31 RX ADMIN — FLUCONAZOLE SCH MG: 100 TABLET ORAL at 10:05

## 2020-08-31 RX ADMIN — FAMOTIDINE SCH MG: 20 TABLET, FILM COATED ORAL at 20:48

## 2020-08-31 NOTE — NUR
advised dr. ponce that INSURANCE  auth  FOR SNF will  today.

no dc order as yet. will followt

-------------------------------------------------------------------------------

Addendum: 20 at 1544 by FLAVIA BRIAN RN CM

-------------------------------------------------------------------------------

Amended: Links added.

## 2020-08-31 NOTE — NUR
DYSPHAGIA EVAL COMPLETED. -S/S OF ASPIRATION. RECOMMEND MECHANICAL SOFT, THIN LIQUIDS; PILLS 
WHOLE WITH LIQUIDS. 



DIET CHANGE PROPHYLAXIS AT THIS TIME. NURSE JESUS REPORTS LOW P.O. DIETICIAN ON CASE AT 
THIS TIME. 

-------------------------------------------------------------------------------

Addendum: 08/31/20 at 1527 by BRITTANY DIALLO, [facility] ST

-------------------------------------------------------------------------------

Amended: Links added.

## 2020-08-31 NOTE — NUR
Received report from Andria, resumed care, 20 g IV in left antecubital and 22 g left wrist 
IV noted, wrapped with gauze, patient resting swallowed oral medications with no 
difficulties, BP 97/49 Rocephin 1 gm IVP and Lactated ringers 500 ml bolus KVO then Lactated 
ringers @ 75 ml q13 hrs physician orders verified. Pt. lying in bed sleeping, bed locked and 
in lowest position call light in reach.

## 2020-09-01 VITALS — DIASTOLIC BLOOD PRESSURE: 76 MMHG | SYSTOLIC BLOOD PRESSURE: 109 MMHG

## 2020-09-01 VITALS — DIASTOLIC BLOOD PRESSURE: 79 MMHG | SYSTOLIC BLOOD PRESSURE: 148 MMHG

## 2020-09-01 VITALS — DIASTOLIC BLOOD PRESSURE: 73 MMHG | SYSTOLIC BLOOD PRESSURE: 113 MMHG

## 2020-09-01 VITALS — DIASTOLIC BLOOD PRESSURE: 59 MMHG | SYSTOLIC BLOOD PRESSURE: 110 MMHG

## 2020-09-01 VITALS — SYSTOLIC BLOOD PRESSURE: 137 MMHG | DIASTOLIC BLOOD PRESSURE: 90 MMHG

## 2020-09-01 VITALS — SYSTOLIC BLOOD PRESSURE: 153 MMHG | DIASTOLIC BLOOD PRESSURE: 68 MMHG

## 2020-09-01 LAB
BUN SERPL-MCNC: 11 MG/DL (ref 7–18)
CHLORIDE SERPL-SCNC: 106 MMOL/L (ref 101–111)
CO2 SERPL-SCNC: 19 MMOL/L (ref 21–32)
CREAT SERPL-MCNC: 1.1 MG/DL (ref 0.5–1.5)
GFR SERPL CREATININE-BSD FRML MDRD: 50 ML/MIN (ref 60–?)
GLUCOSE SERPL-MCNC: 90 MG/DL (ref 70–105)
MAGNESIUM SERPL-MCNC: 1.8 MG/DL (ref 1.8–2.4)
PHOSPHATE SERPL-MCNC: 3.3 MG/DL (ref 2.5–4.9)
POTASSIUM SERPL-SCNC: 4.6 MMOL/L (ref 3.5–5.1)
SODIUM SERPL-SCNC: 136 MMOL/L (ref 136–145)

## 2020-09-01 RX ADMIN — FLUCONAZOLE SCH MG: 100 TABLET ORAL at 08:52

## 2020-09-01 RX ADMIN — ENOXAPARIN SODIUM SCH MG: 30 INJECTION SUBCUTANEOUS at 08:52

## 2020-09-01 RX ADMIN — FAMOTIDINE SCH MG: 20 TABLET, FILM COATED ORAL at 21:59

## 2020-09-01 RX ADMIN — FAMOTIDINE SCH MG: 20 TABLET, FILM COATED ORAL at 08:52

## 2020-09-01 RX ADMIN — WATER SCH GM: 1 INJECTION INTRAVENOUS at 21:59

## 2020-09-01 RX ADMIN — WATER SCH GM: 1 INJECTION INTRAVENOUS at 08:33

## 2020-09-01 RX ADMIN — Medication SCH MLS/HR: at 12:27

## 2020-09-02 VITALS — DIASTOLIC BLOOD PRESSURE: 88 MMHG | SYSTOLIC BLOOD PRESSURE: 140 MMHG

## 2020-09-02 VITALS — DIASTOLIC BLOOD PRESSURE: 82 MMHG | SYSTOLIC BLOOD PRESSURE: 136 MMHG

## 2020-09-02 VITALS — DIASTOLIC BLOOD PRESSURE: 64 MMHG | SYSTOLIC BLOOD PRESSURE: 108 MMHG

## 2020-09-02 VITALS — DIASTOLIC BLOOD PRESSURE: 83 MMHG | SYSTOLIC BLOOD PRESSURE: 106 MMHG

## 2020-09-02 VITALS — DIASTOLIC BLOOD PRESSURE: 79 MMHG | SYSTOLIC BLOOD PRESSURE: 137 MMHG

## 2020-09-02 LAB
ALBUMIN SERPL-MCNC: 1.9 G/DL (ref 3.5–5)
ALT SERPL-CCNC: 25 U/L (ref 12–78)
AST SERPL-CCNC: 33 U/L (ref 10–37)
BILIRUB SERPL-MCNC: 0.3 MG/DL (ref 0.2–1)
BUN SERPL-MCNC: 11 MG/DL (ref 7–18)
CHLORIDE SERPL-SCNC: 109 MMOL/L (ref 101–111)
CO2 SERPL-SCNC: 21 MMOL/L (ref 21–32)
CREAT SERPL-MCNC: 1.1 MG/DL (ref 0.5–1.5)
GFR SERPL CREATININE-BSD FRML MDRD: 50 ML/MIN (ref 60–?)
GLUCOSE SERPL-MCNC: 90 MG/DL (ref 70–105)
POTASSIUM SERPL-SCNC: 4.7 MMOL/L (ref 3.5–5.1)
PROT SERPL-MCNC: 5.8 G/DL (ref 6–8.3)
SODIUM SERPL-SCNC: 140 MMOL/L (ref 136–145)

## 2020-09-02 RX ADMIN — DEXTROSE AND SODIUM CHLORIDE SCH MLS/HR: 5; .9 INJECTION, SOLUTION INTRAVENOUS at 13:10

## 2020-09-02 RX ADMIN — WATER SCH GM: 1 INJECTION INTRAVENOUS at 09:00

## 2020-09-02 RX ADMIN — WATER SCH GM: 1 INJECTION INTRAVENOUS at 21:42

## 2020-09-02 RX ADMIN — ENOXAPARIN SODIUM SCH MG: 30 INJECTION SUBCUTANEOUS at 09:51

## 2020-09-02 RX ADMIN — FAMOTIDINE SCH MG: 20 TABLET, FILM COATED ORAL at 09:50

## 2020-09-02 RX ADMIN — Medication SCH MLS/HR: at 01:25

## 2020-09-02 RX ADMIN — DEXTROSE AND SODIUM CHLORIDE SCH MLS/HR: 5; .9 INJECTION, SOLUTION INTRAVENOUS at 09:51

## 2020-09-02 RX ADMIN — FAMOTIDINE SCH MG: 20 TABLET, FILM COATED ORAL at 21:42

## 2020-09-02 NOTE — NUR
RD FOLLOW UP 



Pt continues with poor PO >1 week. RN reports Pt with increased intake when family brought 
breakfast yesterday, however Poor PO this AM despite family's efforts. Pt sips Ensure 
throughout the day and may finish 1-1.5 bottles per day. 

Monitored labs: Ca 10.6, GFR 50, Alb 2.9. D5 initiated [id number]. No report of weight loss. 



Poor PO > 1 week; Recommend Appetite stimulant

If Poor PO continues, recommend supplemental alternate means nutrition



RD to continue to monitor. Please notify as additional nutrition concerns arise. Thank you. 

-------------------------------------------------------------------------------

Addendum: 09/02/20 at 1554 by HILDA LERNER RD RD

-------------------------------------------------------------------------------

Amended: Links added.

## 2020-09-03 VITALS — DIASTOLIC BLOOD PRESSURE: 78 MMHG | SYSTOLIC BLOOD PRESSURE: 135 MMHG

## 2020-09-03 VITALS — DIASTOLIC BLOOD PRESSURE: 79 MMHG | SYSTOLIC BLOOD PRESSURE: 138 MMHG

## 2020-09-03 VITALS — SYSTOLIC BLOOD PRESSURE: 133 MMHG | DIASTOLIC BLOOD PRESSURE: 67 MMHG

## 2020-09-03 VITALS — SYSTOLIC BLOOD PRESSURE: 148 MMHG | DIASTOLIC BLOOD PRESSURE: 73 MMHG

## 2020-09-03 VITALS — DIASTOLIC BLOOD PRESSURE: 59 MMHG | SYSTOLIC BLOOD PRESSURE: 134 MMHG

## 2020-09-03 VITALS — DIASTOLIC BLOOD PRESSURE: 70 MMHG | SYSTOLIC BLOOD PRESSURE: 139 MMHG

## 2020-09-03 LAB
ALBUMIN SERPL-MCNC: 1.7 G/DL (ref 3.5–5)
ALT SERPL-CCNC: 24 U/L (ref 12–78)
AST SERPL-CCNC: 33 U/L (ref 10–37)
BILIRUB SERPL-MCNC: 0.2 MG/DL (ref 0.2–1)
BUN SERPL-MCNC: 8 MG/DL (ref 7–18)
CHLORIDE SERPL-SCNC: 110 MMOL/L (ref 101–111)
CO2 SERPL-SCNC: 23 MMOL/L (ref 21–32)
CREAT SERPL-MCNC: 0.9 MG/DL (ref 0.5–1.5)
GFR SERPL CREATININE-BSD FRML MDRD: 63 ML/MIN (ref 60–?)
GLUCOSE SERPL-MCNC: 100 MG/DL (ref 70–105)
MAGNESIUM SERPL-MCNC: 1.8 MG/DL (ref 1.8–2.4)
POTASSIUM SERPL-SCNC: 3.7 MMOL/L (ref 3.5–5.1)
PROT SERPL-MCNC: 5.2 G/DL (ref 6–8.3)
SODIUM SERPL-SCNC: 141 MMOL/L (ref 136–145)

## 2020-09-03 RX ADMIN — WATER SCH GM: 1 INJECTION INTRAVENOUS at 08:53

## 2020-09-03 RX ADMIN — DEXTROSE AND SODIUM CHLORIDE SCH MLS/HR: 5; .9 INJECTION, SOLUTION INTRAVENOUS at 10:06

## 2020-09-03 RX ADMIN — DEXTROSE AND SODIUM CHLORIDE SCH MLS/HR: 5; .9 INJECTION, SOLUTION INTRAVENOUS at 19:15

## 2020-09-03 RX ADMIN — FAMOTIDINE SCH MG: 20 TABLET, FILM COATED ORAL at 08:53

## 2020-09-03 RX ADMIN — FAMOTIDINE SCH MG: 20 TABLET, FILM COATED ORAL at 20:31

## 2020-09-03 RX ADMIN — WATER SCH GM: 1 INJECTION INTRAVENOUS at 20:31

## 2020-09-03 RX ADMIN — ENOXAPARIN SODIUM SCH MG: 30 INJECTION SUBCUTANEOUS at 08:54

## 2020-09-03 RX ADMIN — DEXTROSE AND SODIUM CHLORIDE SCH MLS/HR: 5; .9 INJECTION, SOLUTION INTRAVENOUS at 04:02

## 2020-09-03 NOTE — NUR
Received report per Andria pt. removed IV. New IV 20g inserted per Marlin in left forearm 
site cleaned with chlorhexidine and flushed w/ 10ml normal saline, no s/s of redness, warmth 
or infiltration noted. Site wrapped with gauze. Pt verbalizes no c/o discomfort. Will 
continue to monitor

## 2020-09-03 NOTE — NUR
RD UPDATE



Pt continues refusal to eat. Appetite stimulant contraindicated with COVID, as per MD. 
Ensure TID in place.



Recommend protein supplementation with 60mL ProMod BID.



RD to continue to monitor.

## 2020-09-03 NOTE — NUR
FOLLOW UP COMPLETED. 



Pt TOLERATING CURRENT DIET WITH LOW P.O. AS PER NURSE, Pt ABLE TO TAKE MEDICATIONS WITH NO 
OVERT S/S OF ASPIRATION. SLP COORDINATED WITH HENRY STEVENS DUE TO LOW P.O. SWALLOW FUNCTION 
PRESENT AND OK FOR CONTINUED P.O. DIET; POOR APPETITE AT THIS TIME. 

-------------------------------------------------------------------------------

Addendum: 09/03/20 at 1429 by BRITTANY DIALLO, [facility]

-------------------------------------------------------------------------------

Amended: Links added.

## 2020-09-04 VITALS — DIASTOLIC BLOOD PRESSURE: 62 MMHG | SYSTOLIC BLOOD PRESSURE: 123 MMHG

## 2020-09-04 VITALS — DIASTOLIC BLOOD PRESSURE: 60 MMHG | SYSTOLIC BLOOD PRESSURE: 94 MMHG

## 2020-09-04 VITALS — SYSTOLIC BLOOD PRESSURE: 145 MMHG | DIASTOLIC BLOOD PRESSURE: 80 MMHG

## 2020-09-04 VITALS — DIASTOLIC BLOOD PRESSURE: 67 MMHG | SYSTOLIC BLOOD PRESSURE: 134 MMHG

## 2020-09-04 VITALS — DIASTOLIC BLOOD PRESSURE: 73 MMHG | SYSTOLIC BLOOD PRESSURE: 151 MMHG

## 2020-09-04 VITALS — SYSTOLIC BLOOD PRESSURE: 131 MMHG | DIASTOLIC BLOOD PRESSURE: 84 MMHG

## 2020-09-04 LAB
BASOPHILS NFR BLD AUTO: 0.9 % (ref 0–5)
BUN SERPL-MCNC: 7 MG/DL (ref 7–18)
CHLORIDE SERPL-SCNC: 111 MMOL/L (ref 101–111)
CO2 SERPL-SCNC: 22 MMOL/L (ref 21–32)
CREAT SERPL-MCNC: 0.8 MG/DL (ref 0.5–1.5)
EOSINOPHIL NFR BLD AUTO: 5.4 % (ref 0–8)
ERYTHROCYTE [DISTWIDTH] IN BLOOD BY AUTOMATED COUNT: 18.6 % (ref 11–15.5)
GFR SERPL CREATININE-BSD FRML MDRD: 72 ML/MIN (ref 60–?)
GLUCOSE SERPL-MCNC: 110 MG/DL (ref 70–105)
HCT VFR BLD AUTO: 27.5 % (ref 36–48)
LYMPHOCYTES NFR SPEC AUTO: 33.2 % (ref 21–51)
MAGNESIUM SERPL-MCNC: 1.3 MG/DL (ref 1.8–2.4)
MCH RBC QN AUTO: 29.4 PG (ref 27–33)
MCHC RBC AUTO-ENTMCNC: 32 G/DL (ref 32–36)
MCV RBC AUTO: 92 FL (ref 79–99)
MONOCYTES NFR BLD AUTO: 8.8 % (ref 3–13)
NEUTROPHILS NFR BLD AUTO: 51.3 % (ref 40–77)
NRBC BLD MANUAL-RTO: 0 % (ref 0–0.19)
PHOSPHATE SERPL-MCNC: 2.3 MG/DL (ref 2.5–4.9)
PLATELET # BLD AUTO: 241 K/UL (ref 130–400)
POTASSIUM SERPL-SCNC: 3.1 MMOL/L (ref 3.5–5.1)
RBC # BLD AUTO: 2.99 MIL/UL (ref 4–5.5)
SODIUM SERPL-SCNC: 143 MMOL/L (ref 136–145)
WBC # BLD AUTO: 10.9 K/UL (ref 4.8–10.8)

## 2020-09-04 RX ADMIN — MAGNESIUM SULFATE IN WATER SCH MLS/HR: 40 INJECTION, SOLUTION INTRAVENOUS at 11:44

## 2020-09-04 RX ADMIN — FAMOTIDINE SCH MG: 20 TABLET, FILM COATED ORAL at 08:52

## 2020-09-04 RX ADMIN — ENOXAPARIN SODIUM SCH MG: 30 INJECTION SUBCUTANEOUS at 08:52

## 2020-09-04 RX ADMIN — WATER SCH GM: 1 INJECTION INTRAVENOUS at 08:50

## 2020-09-04 RX ADMIN — DEXTROSE AND SODIUM CHLORIDE SCH MLS/HR: 5; .9 INJECTION, SOLUTION INTRAVENOUS at 16:04

## 2020-09-04 RX ADMIN — DEXTROSE AND SODIUM CHLORIDE SCH MLS/HR: 5; .9 INJECTION, SOLUTION INTRAVENOUS at 03:17

## 2020-09-04 NOTE — NUR
Pt transported to [facility] via EMS ground in [place]. Pt remains in unchanged 
stable condition. Family notified of transfer, all questions answered and concerns addressed 
for EMS personnel and family.